# Patient Record
Sex: FEMALE | Race: WHITE | NOT HISPANIC OR LATINO
[De-identification: names, ages, dates, MRNs, and addresses within clinical notes are randomized per-mention and may not be internally consistent; named-entity substitution may affect disease eponyms.]

---

## 2022-01-14 ENCOUNTER — APPOINTMENT (OUTPATIENT)
Dept: GASTROENTEROLOGY | Facility: CLINIC | Age: 22
End: 2022-01-14
Payer: MEDICAID

## 2022-01-14 VITALS
OXYGEN SATURATION: 99 % | WEIGHT: 130 LBS | SYSTOLIC BLOOD PRESSURE: 110 MMHG | HEIGHT: 62 IN | TEMPERATURE: 97.1 F | HEART RATE: 87 BPM | BODY MASS INDEX: 23.92 KG/M2 | DIASTOLIC BLOOD PRESSURE: 70 MMHG | RESPIRATION RATE: 15 BRPM

## 2022-01-14 DIAGNOSIS — R10.9 UNSPECIFIED ABDOMINAL PAIN: ICD-10-CM

## 2022-01-14 PROBLEM — Z00.00 ENCOUNTER FOR PREVENTIVE HEALTH EXAMINATION: Status: ACTIVE | Noted: 2022-01-14

## 2022-01-14 PROCEDURE — 99204 OFFICE O/P NEW MOD 45 MIN: CPT

## 2022-01-17 NOTE — ASSESSMENT
[FreeTextEntry1] : 20 yo F PMH elevated cholesterol, PCOS who presents for evaluation of bloating that started Sept/Oct 2021 as well as change in bowel habits around that same time. \par \par Bloating, change in bowel habits: symptoms started in October\par - has tried FODMAP diet in the past but went on FODMAP for 8 weeks and was eating some FODMAPS just in more limited quantities. We reviewed that the FODMAP diet is supposed to be used only for 2-3 weeks to eliminate all FODMAP foods and to reintroduce foods one at a time to identify specific food triggers\par - should also eliminate dairy and gluten, which she has not yet done. Would also reintroduce these one at a time\par - nutritionist referral for assistance with the diet\par - SIBO breath test\par - diet/symptom journal\par - patients admits to having a lot of anxiety and stressors, wants to go back to school for nursing. We discussed the gut-brain axis disorders and the importance of trying to manage stress with mindfullness, meditation, yoga techniques\par - follow Mediterranean diet and \par - has been checked for celiac and negative, TSH wnl\par - patient is hesitant to get more invasive testing, if symptoms are not resolved within 1 month then will consider further work up with imaging, EGD with disaccharidase testing; colonoscopy for evaluation of change in bowel habits and work up of possible motility issues\par \par Follow up in 1 month

## 2022-01-17 NOTE — HISTORY OF PRESENT ILLNESS
[de-identified] : 22 yo F PMH elevated cholesterol, PCOS who presents for evaluation of bloating that started Sept/Oct 2021 as well as change in bowel habits around that same time. \par \par Since September/October feels very bloated. Happens after eating anything at all. Even if eats 1 blueberry will get very bloated, abdomen hurts. Usually upper abdomen more bloated. \par \par Has had symptoms for 4-5 mos now.\par \par Has gained 30 lbs in the past 1 year. \par Has images of significant bloating after eating. \par \par Wakes up in AM and abs hurt. \par \par No nighttime symptoms.\par No blood in the stool. \par No fevers, nausea, vomtiing. \par \par Used to have BMs daily, now doesn't go every day, now goes every 2-3 days. It is loose stool. No diarrhea. Feels like stool consistency has changed a lot. Previously used to be much harder but now is much looser and is like a long string. \par \par Has been getting a lot of stomachaches, it seems to be epigastric, periumbilical. \par Pain feels crampy in nature, lasts unitl the bloating goes down (takes about 4-5 hours). \par \par Drinks 30ox water in a day. \par No carbonated beverages, no chewing gum, no hard candies. Uses straws. \par She has lactaid, occasional dairy/cheese. \par \par Has a lot of flatulence. \par Tried gassex but didn't reduce the bloating. Seemed to increase the flatulence. \par \par Was checked for celiac with bloodwork and was negative. \par \par Bok pearl, string beans\par Tried cutting out gluten but didn't reduce bloating \par \par Did fodmap diet for 8 weeks, did not feel significantly better. Everything was the same intensity when reintroudcing foods. Did not feel bloating was better. \par \par Has prior work up with [facility]TG [facility] which was negative. \par \par Has a lot of life stressors. Even picking a place to eat feels stressed - anxiety. \par Depression and mood disorder \par \par She was feeling stable in terms of her life when the bloating started\par \par No prior EGD or colonoscopy\par No SIBO testing\par \par Does not take anything to help with BMs. \par \par CBC normal \par TSH normal 1.61\par BMP: normal \par celiac negative\par elevated cholesterol\par liver panel wnl \par \par Shes always been 100-110 lbs then suddenly was 125/130. Also changed birth control medicaions. Also has a history of PCOS\par \par PMH: \par elevated cholesterol\par PCOS \par \par PSH: \par denies\par \par FH: \par No FH of GI malignancies or IBD \par \par SH: \par never smoker\par social EtoH \par no MJ\par no illicit drug use\par medical scribe for physical therapist \par \par MEDS: \par lexapro \par abilify \par birth control\par \par ALL: \par NKDA

## 2022-01-17 NOTE — PHYSICAL EXAM
[General Appearance - Alert] : alert [General Appearance - In No Acute Distress] : in no acute distress [General Appearance - Well Nourished] : well nourished [General Appearance - Well Developed] : well developed [General Appearance - Well-Appearing] : healthy appearing [Sclera] : the sclera and conjunctiva were normal [PERRL With Normal Accommodation] : pupils were equal in size, round, and reactive to light [Extraocular Movements] : extraocular movements were intact [Outer Ear] : the ears and nose were normal in appearance [Oropharynx] : the oropharynx was normal [Neck Appearance] : the appearance of the neck was normal [Neck Cervical Mass (___cm)] : no neck mass was observed [Jugular Venous Distention Increased] : there was no jugular-venous distention [Thyroid Diffuse Enlargement] : the thyroid was not enlarged [Thyroid Nodule] : there were no palpable thyroid nodules [Auscultation Breath Sounds / Voice Sounds] : lungs were clear to auscultation bilaterally [Heart Rate And Rhythm] : heart rate was normal and rhythm regular [Heart Sounds] : normal S1 and S2 [Heart Sounds Gallop] : no gallops [Murmurs] : no murmurs [Heart Sounds Pericardial Friction Rub] : no pericardial rub [Edema] : there was no peripheral edema [Bowel Sounds] : normal bowel sounds [Abdomen Soft] : soft [Abdomen Tenderness] : non-tender [Abdomen Mass (___ Cm)] : no abdominal mass palpated [FreeTextEntry1] : tender to deep palpation, epigastric area, periumbilical area (above umbilicus), seemed to worsen when asked her to flex abdominal wall muscles [No CVA Tenderness] : no ~M costovertebral angle tenderness [No Spinal Tenderness] : no spinal tenderness [Abnormal Walk] : normal gait [Nail Clubbing] : no clubbing  or cyanosis of the fingernails [Musculoskeletal - Swelling] : no joint swelling seen [Motor Tone] : muscle strength and tone were normal [Skin Color & Pigmentation] : normal skin color and pigmentation [Skin Turgor] : normal skin turgor [] : no rash [Oriented To Time, Place, And Person] : oriented to person, place, and time [Impaired Insight] : insight and judgment were intact [Affect] : the affect was normal

## 2022-03-11 ENCOUNTER — APPOINTMENT (OUTPATIENT)
Dept: GASTROENTEROLOGY | Facility: CLINIC | Age: 22
End: 2022-03-11

## 2022-03-21 ENCOUNTER — APPOINTMENT (OUTPATIENT)
Dept: GASTROENTEROLOGY | Facility: CLINIC | Age: 22
End: 2022-03-21
Payer: MEDICAID

## 2022-03-21 PROCEDURE — 99215 OFFICE O/P EST HI 40 MIN: CPT | Mod: 95

## 2022-03-24 DIAGNOSIS — K63.89 OTHER SPECIFIED DISEASES OF INTESTINE: ICD-10-CM

## 2022-03-24 NOTE — PHYSICAL EXAM
[General Appearance - Alert] : alert [General Appearance - In No Acute Distress] : in no acute distress [General Appearance - Well Nourished] : well nourished [General Appearance - Well Developed] : well developed [General Appearance - Well-Appearing] : healthy appearing [Sclera] : the sclera and conjunctiva were normal [PERRL With Normal Accommodation] : pupils were equal in size, round, and reactive to light [Extraocular Movements] : extraocular movements were intact [Outer Ear] : the ears and nose were normal in appearance [Oropharynx] : the oropharynx was normal [Neck Appearance] : the appearance of the neck was normal [Neck Cervical Mass (___cm)] : no neck mass was observed [Jugular Venous Distention Increased] : there was no jugular-venous distention [Thyroid Diffuse Enlargement] : the thyroid was not enlarged [Thyroid Nodule] : there were no palpable thyroid nodules [Auscultation Breath Sounds / Voice Sounds] : lungs were clear to auscultation bilaterally [Heart Rate And Rhythm] : heart rate was normal and rhythm regular [Heart Sounds] : normal S1 and S2 [Heart Sounds Gallop] : no gallops [Murmurs] : no murmurs [Edema] : there was no peripheral edema [Heart Sounds Pericardial Friction Rub] : no pericardial rub [Bowel Sounds] : normal bowel sounds [Abdomen Soft] : soft [Abdomen Tenderness] : non-tender [Abdomen Mass (___ Cm)] : no abdominal mass palpated [No CVA Tenderness] : no ~M costovertebral angle tenderness [No Spinal Tenderness] : no spinal tenderness [Abnormal Walk] : normal gait [Nail Clubbing] : no clubbing  or cyanosis of the fingernails [Musculoskeletal - Swelling] : no joint swelling seen [Motor Tone] : muscle strength and tone were normal [Skin Color & Pigmentation] : normal skin color and pigmentation [] : no rash [Skin Turgor] : normal skin turgor [Oriented To Time, Place, And Person] : oriented to person, place, and time [Impaired Insight] : insight and judgment were intact [Affect] : the affect was normal [FreeTextEntry1] : tender to deep palpation, epigastric area, periumbilical area (above umbilicus), seemed to worsen when asked her to flex abdominal wall muscles

## 2022-03-24 NOTE — ASSESSMENT
[FreeTextEntry1] : 22 yo F PMH elevated cholesterol, PCOS who presents for evaluation of bloating that started Sept/Oct 2021 as well as change in bowel habits around that same time. 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 We discussed the gut-brain axis disorders and the importance of trying to manage stress with mindfullness, meditation, yoga techniques\par - follow Mediterranean diet and \par - has been checked for celiac and negative, TSH wnl\par - plan for EGD with celiac and disaccharidase testing; colonoscopy for evaluation of change in bowel habits and work up of possible motility issues. Consider possible SIBO/SIFO\par \par Follow up post-procedure

## 2022-04-07 ENCOUNTER — APPOINTMENT (OUTPATIENT)
Dept: GASTROENTEROLOGY | Facility: CLINIC | Age: 22
End: 2022-04-07
Payer: MEDICAID

## 2022-04-07 ENCOUNTER — RESULT REVIEW (OUTPATIENT)
Age: 22
End: 2022-04-07

## 2022-04-07 PROCEDURE — 45380 COLONOSCOPY AND BIOPSY: CPT

## 2022-04-07 PROCEDURE — 43239 EGD BIOPSY SINGLE/MULTIPLE: CPT

## 2022-06-20 ENCOUNTER — APPOINTMENT (OUTPATIENT)
Dept: GASTROENTEROLOGY | Facility: CLINIC | Age: 22
End: 2022-06-20

## 2022-06-20 PROCEDURE — 99213 OFFICE O/P EST LOW 20 MIN: CPT | Mod: 95

## 2022-06-23 NOTE — PHYSICAL EXAM
[General Appearance - Alert] : alert [Sclera] : the sclera and conjunctiva were normal [Outer Ear] : the ears and nose were normal in appearance [Oropharynx] : the oropharynx was normal [Neck Appearance] : the appearance of the neck was normal [Neck Cervical Mass (___cm)] : no neck mass was observed [Jugular Venous Distention Increased] : there was no jugular-venous distention [Thyroid Diffuse Enlargement] : the thyroid was not enlarged [Thyroid Nodule] : there were no palpable thyroid nodules [] : no respiratory distress [Edema] : there was no peripheral edema [Abnormal Walk] : normal gait [Skin Color & Pigmentation] : normal skin color and pigmentation [Oriented To Time, Place, And Person] : oriented to person, place, and time [General Appearance - In No Acute Distress] : in no acute distress [General Appearance - Well Nourished] : well nourished [FreeTextEntry1] : tender to deep palpation, epigastric area, periumbilical area (above umbilicus), seemed to worsen when asked her to flex abdominal wall muscles

## 2022-06-23 NOTE — HISTORY OF PRESENT ILLNESS
[de-identified] : 22 yo F PMH elevated cholesterol, PCOS who presents for evaluation of bloating that started Sept/Oct 2021 as well as change in bowel habits around that same time. \par \par Stools have been better since avoiding lactose. Completed rifaximin. A ltitle better but not back to normal. \par \par After a bit started having bloating again. Has another round and wants to take it. \par \par Has been working with a nutritionist. \par Has not felt the low FODMAP diet has helped. \par \par \par Had a [employer] cheeseburger and got food poisoning for 10 days (this was Feb 10th, after the fodmap diet completed) \par \par Takes about 10-15 min to get bloated \par She consistently bloated on and off the diet \par \par Has been having very loose stool, this started oct 2021\par It got better, now going every day (thinks its related to coffee) \par \par INITIAL HPI\par 22 yo F PMH elevated cholesterol, PCOS who presents for evaluation of bloating that started Sept/Oct 2021 as well as change in bowel habits around that same time. \par \par Since September/October feels very bloated. Happens after eating anything at all. Even if eats 1 blueberry will get very bloated, abdomen hurts. Usually upper abdomen more bloated. \par \par Has had symptoms for 4-5 mos now.\par \par Has gained 30 lbs in the past 1 year. \par Has images of significant bloating after eating. \par \par Wakes up in AM and abs hurt. \par \par No nighttime symptoms.\par No blood in the stool. \par No fevers, nausea, vomtiing. \par \par Used to have BMs daily, now doesn't go every day, now goes every 2-3 days. It is loose stool. No diarrhea. Feels like stool consistency has changed a lot. Previously used to be much harder but now is much looser and is like a long string. \par \par Has been getting a lot of stomachaches, it seems to be epigastric, periumbilical. \par Pain feels crampy in nature, lasts unitl the bloating goes down (takes about 4-5 hours). \par \par Drinks 30ox water in a day. \par No carbonated beverages, no chewing gum, no hard candies. Uses straws. \par She has lactaid, occasional dairy/cheese. \par \par Has a lot of flatulence. \par Tried gassex but didn't reduce the bloating. Seemed to increase the flatulence. \par \par Was checked for celiac with bloodwork and was negative. \par \par Bok pearl, string beans\par Tried cutting out gluten but didn't reduce bloating \par \par Did fodmap diet for 8 weeks, did not feel significantly better. Everything was the same intensity when reintroudcing foods. Did not feel bloating was better. \par \par Has prior work up with [facility] which was negative. \par \par Has a lot of life stressors. Even picking a place to eat feels stressed - anxiety. \par Depression and mood disorder \par \par She was feeling stable in terms of her life when the bloating started\par \par No prior EGD or colonoscopy\par No SIBO testing\par \par Does not take anything to help with BMs. \par \par CBC normal \par TSH normal 1.61\par BMP: normal \par celiac negative\par elevated cholesterol\par liver panel wnl \par \par Shes always been 100-110 lbs then suddenly was 125/130. Also changed birth control medicaions. Also has a history of PCOS\par \par PMH: \par elevated cholesterol\par PCOS \par \par PSH: \par denies\par \par FH: \par No FH of GI malignancies or IBD \par \par SH: \par never smoker\par social EtoH \par no MJ\par no illicit drug use\par medical scribe for physical therapist \par \par MEDS: \par lexapro \par abilify \par birth control\par \par ALL: \par NKDA

## 2022-06-23 NOTE — ASSESSMENT
[FreeTextEntry1] : 20 yo F PMH elevated cholesterol, PCOS who presents for follow up of bloating that started Sept/Oct 2021 as well as change in bowel habits around that same time. 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 We discussed the gut-brain axis disorders and the importance of trying to manage stress with mindfullness, meditation, yoga techniques\par - follow Mediterranean diet and \par - has been checked for celiac and negative, TSH wnl\par - s/p EGD with disaccharidase testing positive for lactase deficiency, colonoscopy unremarkable\par \par Follow up in 6 months

## 2022-08-17 ENCOUNTER — APPOINTMENT (OUTPATIENT)
Dept: GASTROENTEROLOGY | Facility: CLINIC | Age: 22
End: 2022-08-17

## 2022-08-17 ENCOUNTER — TRANSCRIPTION ENCOUNTER (OUTPATIENT)
Age: 22
End: 2022-08-17

## 2022-08-17 PROCEDURE — 99214 OFFICE O/P EST MOD 30 MIN: CPT | Mod: 95

## 2022-08-22 NOTE — ASSESSMENT
[FreeTextEntry1] : 20 yo F PMH elevated cholesterol, PCOS, and recently diagnosed Hashimoto's disease with hypothyroidism  presenting for f/u of abdominal bloating and diarrhea that started in Fall 2021. 4/2022 EGD and colonoscopy were not diagnostic, +lactase deficiency, +SIBO now s/p 2 courses of rifaximin with recurrent symptoms. Initially better after each rifaximin course but bloating returned. Only current complaint is abdominal bloating which occurs soon after eating. Denies N/V, abdominal pain, constipation, diarrhea, or excessive flatulence. She is following dairy free diet and has tried strict FODMAP diet with nutritionist which she states did not change her symptoms at all. Since starting Synthroid for the Hashimoto's, her mood and energy level has significantly improved.\par \par # Abdominal Bloating; unclear etiology; due to hypothyroidism vs functional bloating\par - given unsuccessful rifaxamin course x2, careful avoidance of dairy, and otherwise negative w/u thus far from GI perspective, symptoms may be attributable to her newly diagnosed thyroid disease. She described initial onset as abrupt with bloating and diarrhea and now the diarrhea aspect has resolved with only bloating remaining. This can possible be correlated to the natural course of Hashimoto's with hyperthyroid state followed by hypothyroid state which she is currently in as per her w/u at [facility] and currently on synthroid therapy \par - continue avoidance of lactose (given lactase deficiency on disaccharidase testing)\par - c/w nutritionist (patient is working with one now) \par - c/w diet/symptom journal\par - would wait until thryoid function is corrected and reassess symptoms at that time. If symptoms persist despite normal thyroid function, consider gastric emptying study to r/u organic cause of bloating\par \par f/u in 6 months

## 2022-08-22 NOTE — HISTORY OF PRESENT ILLNESS
[Home] : at home, [unfilled] , at the time of the visit. [Medical Office: ([facility])___] : at the medical office located in  [Verbal consent obtained from patient] : the patient, [unfilled] [de-identified] : 22 yo F PMH elevated cholesterol, PCOS, and recently diagnosed Hashimoto's disease with hypothyroidism  presenting for f/u of abdominal bloating and diarrhea that started in Fall 2021. Pt previously seen for bloating and loose stool, now s/p EGD and colonoscopy which were not diagnostic, +lactase deficiency, +SIBO now s/p 2 courses of rifaximin with recurrent symptoms. She reports that after each rifaximin course she felt better for a few days but then bloating returned. Only current complaint is abdominal bloating which occurs soon after eating. Denies N/V, abdominal pain, constipation, diarrhea, or excessive flatulence. She is following dairy free diet and has tried strict FODMAP diet with nutritionist which she states did not change her symptoms at all. Since starting Synthroid for the Hashimoto's, her mood and energy level has significantly improved. Planning on going back to graduate school soon. \par \par \par Prior Hx\par \par 22 yo F PMH elevated cholesterol, PCOS who presents for evaluation of bloating that started Sept/Oct 2021 as well as change in bowel habits around that same time. \par \par Stools have been better since avoiding lactose. Completed rifaximin. A ltitle better but not back to normal. \par \par After a bit started having bloating again. Has another round and wants to take it. \par \par Has been working with a nutritionist. \par Has not felt the low FODMAP diet has helped. \par \par \par Had a [employer] cheeseburger and got food poisoning for 10 days (this was Feb 10th, after the fodmap diet completed) \par \par Takes about 10-15 min to get bloated \par She consistently bloated on and off the diet \par \par Has been having very loose stool, this started oct 2021\par It got better, now going every day (thinks its related to coffee) \par \par INITIAL HPI\par 22 yo F PMH elevated cholesterol, PCOS who presents for evaluation of bloating that started Sept/Oct 2021 as well as change in bowel habits around that same time. \par \par Since September/October feels very bloated. Happens after eating anything at all. Even if eats 1 blueberry will get very bloated, abdomen hurts. Usually upper abdomen more bloated. \par \par Has had symptoms for 4-5 mos now.\par \par Has gained 30 lbs in the past 1 year. \par Has images of significant bloating after eating. \par \par Wakes up in AM and abs hurt. \par \par No nighttime symptoms.\par No blood in the stool. \par No fevers, nausea, vomtiing. \par \par Used to have BMs daily, now doesn't go every day, now goes every 2-3 days. It is loose stool. No diarrhea. Feels like stool consistency has changed a lot. Previously used to be much harder but now is much looser and is like a long string. \par \par Has been getting a lot of stomachaches, it seems to be epigastric, periumbilical. \par Pain feels crampy in nature, lasts unitl the bloating goes down (takes about 4-5 hours). \par \par Drinks 30ox water in a day. \par No carbonated beverages, no chewing gum, no hard candies. Uses straws. \par She has lactaid, occasional dairy/cheese. \par \par Has a lot of flatulence. \par Tried gassex but didn't reduce the bloating. Seemed to increase the flatulence. \par \par Was checked for celiac with bloodwork and was negative. \par \par Bok pearl, string beans\par Tried cutting out gluten but didn't reduce bloating \par \par Did fodmap diet for 8 weeks, did not feel significantly better. Everything was the same intensity when reintroudcing foods. Did not feel bloating was better. \par \par Has prior work up with TTG IGA which was negative. \par \par Has a lot of life stressors. Even picking a place to eat feels stressed - anxiety. \par Depression and mood disorder \par \par She was feeling stable in terms of her life when the bloating started\par \par No prior EGD or colonoscopy\par No SIBO testing\par \par Does not take anything to help with BMs. \par \par CBC normal \par TSH normal 1.61\par BMP: normal \par celiac negative\par elevated cholesterol\par liver panel wnl \par \par Shes always been 100-110 lbs then suddenly was 125/130. Also changed birth control medicaions. Also has a history of PCOS\par \par PMH: \par elevated cholesterol\par PCOS \par \par PSH: \par denies\par \par FH: \par No FH of GI malignancies or IBD \par \par SH: \par never smoker\par social EtoH \par no MJ\par no illicit drug use\par medical scribe for physical therapist \par \par MEDS: \par lexapro \par abilify \par birth control\par \par ALL: \par NKDA.

## 2022-08-22 NOTE — HISTORY OF PRESENT ILLNESS
[Home] : at home, [unfilled] , at the time of the visit. [Medical Office: ([facility])___] : at the medical office located in  [Verbal consent obtained from patient] : the patient, [unfilled] [de-identified] : 22 yo F PMH elevated cholesterol, PCOS, and recently diagnosed Hashimoto's disease with hypothyroidism  presenting for f/u of abdominal bloating and diarrhea that started in Fall 2021. Pt previously seen for bloating and loose stool, now s/p EGD and colonoscopy which were not diagnostic, +lactase deficiency, +SIBO now s/p 2 courses of rifaximin with recurrent symptoms. She reports that after each rifaximin course she felt better for a few days but then bloating returned. Only current complaint is abdominal bloating which occurs soon after eating. Denies N/V, abdominal pain, constipation, diarrhea, or excessive flatulence. She is following dairy free diet and has tried strict FODMAP diet with nutritionist which she states did not change her symptoms at all. Since starting Synthroid for the Hashimoto's, her mood and energy level has significantly improved. Planning on going back to graduate school soon. \par \par \par Prior Hx\par \par 22 yo F PMH elevated cholesterol, PCOS who presents for evaluation of bloating that started Sept/Oct 2021 as well as change in bowel habits around that same time. \par \par Stools have been better since avoiding lactose. Completed rifaximin. A ltitle better but not back to normal. \par \par After a bit started having bloating again. Has another round and wants to take it. \par \par Has been working with a nutritionist. \par Has not felt the low FODMAP diet has helped. \par \par \par Had a [employer] cheeseburger and got food poisoning for 10 days (this was Feb 10th, after the fodmap diet completed) \par \par Takes about 10-15 min to get bloated \par She consistently bloated on and off the diet \par \par Has been having very loose stool, this started oct 2021\par It got better, now going every day (thinks its related to coffee) \par \par INITIAL HPI\par 22 yo F PMH elevated cholesterol, PCOS who presents for evaluation of bloating that started Sept/Oct 2021 as well as change in bowel habits around that same time. \par \par Since September/October feels very bloated. Happens after eating anything at all. Even if eats 1 blueberry will get very bloated, abdomen hurts. Usually upper abdomen more bloated. \par \par Has had symptoms for 4-5 mos now.\par \par Has gained 30 lbs in the past 1 year. \par Has images of significant bloating after eating. \par \par Wakes up in AM and abs hurt. \par \par No nighttime symptoms.\par No blood in the stool. \par No fevers, nausea, vomtiing. \par \par Used to have BMs daily, now doesn't go every day, now goes every 2-3 days. It is loose stool. No diarrhea. Feels like stool consistency has changed a lot. Previously used to be much harder but now is much looser and is like a long string. \par \par Has been getting a lot of stomachaches, it seems to be epigastric, periumbilical. \par Pain feels crampy in nature, lasts unitl the bloating goes down (takes about 4-5 hours). \par \par Drinks 30ox water in a day. \par No carbonated beverages, no chewing gum, no hard candies. Uses straws. \par She has lactaid, occasional dairy/cheese. \par \par Has a lot of flatulence. \par Tried gassex but didn't reduce the bloating. Seemed to increase the flatulence. \par \par Was checked for celiac with bloodwork and was negative. \par \par Bok pearl, string beans\par Tried cutting out gluten but didn't reduce bloating \par \par Did fodmap diet for 8 weeks, did not feel significantly better. Everything was the same intensity when reintroudcing foods. Did not feel bloating was better. \par \par Has prior work up with [facility] which was negative. \par \par Has a lot of life stressors. Even picking a place to eat feels stressed - anxiety. \par Depression and mood disorder \par \par She was feeling stable in terms of her life when the bloating started\par \par No prior EGD or colonoscopy\par No SIBO testing\par \par Does not take anything to help with BMs. \par \par CBC normal \par TSH normal 1.61\par BMP: normal \par celiac negative\par elevated cholesterol\par liver panel wnl \par \par Shes always been 100-110 lbs then suddenly was 125/130. Also changed birth control medicaions. Also has a history of PCOS\par \par PMH: \par elevated cholesterol\par PCOS \par \par PSH: \par denies\par \par FH: \par No FH of GI malignancies or IBD \par \par SH: \par never smoker\par social EtoH \par no MJ\par no illicit drug use\par medical scribe for physical therapist \par \par MEDS: \par lexapro \par abilify \par birth control\par \par ALL: \par NKDA.

## 2022-08-22 NOTE — ASSESSMENT
[FreeTextEntry1] : 20 yo F PMH elevated cholesterol, PCOS, and recently diagnosed Hashimoto's disease with hypothyroidism  presenting for f/u of abdominal bloating and diarrhea that started in Fall 2021. 4/2022 EGD and colonoscopy were not diagnostic, +lactase deficiency, +SIBO now s/p 2 courses of rifaximin with recurrent symptoms. Initially better after each rifaximin course but bloating returned. Only current complaint is abdominal bloating which occurs soon after eating. Denies N/V, abdominal pain, constipation, diarrhea, or excessive flatulence. She is following dairy free diet and has tried strict FODMAP diet with nutritionist which she states did not change her symptoms at all. Since starting Synthroid for the Hashimoto's, her mood and energy level has significantly improved.\par \par # Abdominal Bloating; unclear etiology; due to hypothyroidism vs functional bloating\par - given unsuccessful rifaxamin course x2, careful avoidance of dairy, and otherwise negative w/u thus far from GI perspective, symptoms may be attributable to her newly diagnosed thyroid disease. She described initial onset as abrupt with bloating and diarrhea and now the diarrhea aspect has resolved with only bloating remaining. This can possible be correlated to the natural course of Hashimoto's with hyperthyroid state followed by hypothyroid state which she is currently in as per her w/u at [facility] and currently on synthroid therapy \par - continue avoidance of lactose (given lactase deficiency on disaccharidase testing)\par - c/w nutritionist (patient is working with one now) \par - c/w diet/symptom journal\par - would wait until thryoid function is corrected and reassess symptoms at that time. If symptoms persist despite normal thyroid function, consider gastric emptying study to r/u organic cause of bloating\par \par f/u in 6 months

## 2023-02-22 ENCOUNTER — APPOINTMENT (OUTPATIENT)
Dept: ENDOCRINOLOGY | Facility: CLINIC | Age: 23
End: 2023-02-22
Payer: MEDICAID

## 2023-02-22 DIAGNOSIS — E06.3 AUTOIMMUNE THYROIDITIS: ICD-10-CM

## 2023-02-22 PROCEDURE — 99204 OFFICE O/P NEW MOD 45 MIN: CPT

## 2023-02-22 RX ORDER — LEVOTHYROXINE SODIUM 0.03 MG/1
25 TABLET ORAL DAILY
Qty: 30 | Refills: 5 | Status: ACTIVE | COMMUNITY
Start: 2023-02-22 | End: 1900-01-01

## 2023-02-23 RX ORDER — DROSPIRENONE AND ETHINYL ESTRADIOL 0.02-3(28)
3-0.02 KIT ORAL
Qty: 84 | Refills: 0 | Status: ACTIVE | COMMUNITY
Start: 2022-10-28

## 2023-02-23 RX ORDER — RIFAXIMIN 550 MG/1
550 TABLET ORAL
Qty: 42 | Refills: 0 | Status: DISCONTINUED | COMMUNITY
Start: 2022-03-24 | End: 2023-02-23

## 2023-02-23 RX ORDER — ESCITALOPRAM OXALATE 10 MG/1
10 TABLET ORAL
Qty: 30 | Refills: 0 | Status: ACTIVE | COMMUNITY
Start: 2023-01-07

## 2023-02-23 NOTE — ASSESSMENT
[FreeTextEntry1] : Hashimoto's.  PCOS\par Explained that immune system is attacking thyroid, causing hypothyroidism.  Need for replacement thyroxine will likely be lifelong, and it is not uncommon to require higher thyroxine doses with longer duration of Hashimoto's, as more of native gland fails. \par Since TSH is in mid normal range, symptoms are not due to thyroid cause.\par will send for thyroid sono to evaluate for nodules.\par Suggested sleep evaluation \par I recommended low glycemic (low carb) diet, and agree with estrogen/progestin pill treatment.  If weight gain continues, we could try metformin.   I think weight gain is due to PCOS and maybe some contribution from Abilify (but not due to thyroid)\par RTO 1 year\par

## 2023-02-23 NOTE — HISTORY OF PRESENT ILLNESS
[FreeTextEntry1] : here with mother\par Mother has Hashimoto's and thyroid was monitored/checked in patient during high school.\par She had previously seen Dr Dye at [facility] (who has now reduced her patient hours)\par Since Nov 2021, pt was gaining weight and had increasing fatigue, needing to sleep more. \par She was diagnosed with PCOS and after starting estrogen/progestin pill, she lost weight.\par Currently, she reports excessive sleepiness, to the point she is falling asleep in meetings (around 11am and 4:30pm), bloating, and excessive sweating.  She is also gaining weight again, 30 lb in the past 6 months.   Levothyroxine was started 6 months ago.\par She has some acne, no hirsutism.\par She saw GI, was diagnosed with SIBO and treated with antibiotics and Xifaxan but bloating persists.\par She sleeps 8-10 hours/night.\par \par Meds\par levothyroxine 25mcg\par Lexapro, Abilify  (on Ability for about 1 year)\par Brissa estrogen/progestin pill

## 2023-02-23 NOTE — PHYSICAL EXAM
[Alert] : alert [No Acute Distress] : no acute distress [No Proptosis] : no proptosis [No Lid Lag] : no lid lag [Normal Hearing] : hearing was normal [No LAD] : no lymphadenopathy [Clear to Auscultation] : lungs were clear to auscultation bilaterally [Normal S1, S2] : normal S1 and S2 [Regular Rhythm] : with a regular rhythm [Normal Affect] : the affect was normal [Normal Mood] : the mood was normal [de-identified] : thyroid palpable, soft, mildly enlarged L> R

## 2023-02-23 NOTE — DATA REVIEWED
[FreeTextEntry1] : 10/22 (pt phone)  TSH 1.83, tot chol 177, trig 174, HDL 60, LDL 88, B12 408\par 7/22 (pr phone)  TSH 3.5475, TPO 1.1, Tg Ab 291, celiac Ab neg

## 2023-05-03 ENCOUNTER — APPOINTMENT (OUTPATIENT)
Dept: PULMONOLOGY | Facility: CLINIC | Age: 23
End: 2023-05-03

## 2023-08-09 ENCOUNTER — APPOINTMENT (OUTPATIENT)
Dept: GASTROENTEROLOGY | Facility: CLINIC | Age: 23
End: 2023-08-09
Payer: MEDICAID

## 2023-08-09 PROCEDURE — 99214 OFFICE O/P EST MOD 30 MIN: CPT | Mod: 95

## 2023-08-09 NOTE — ASSESSMENT
[FreeTextEntry1] : 23 yo F PMH elevated cholesterol, PCOS, Hashimoto's who presents for evaluation of bloating that started Sept/Oct 2021 as well as change in bowel habits around that same time.   # Abdominal Bloating; unclear etiology, weight gain - likely a component of PCOS contributing to weight gain as well as abilify - rifaxamin course x2 for SIBO with initial improvement then recurrence - using lactase supplements but not avoiding dairy (given lactase deficiency on disaccharidase testing) - nutritionist referral - c/w diet/symptom journal - CT A/P given ongoing symptoms of abdominal discomfort/bloating, and weight gain (normal EGD/Colon) - if above work up unrevealing consider gastric emptying study to r/u organic cause of bloating - trial of IBGard - trial beano before meals (feels gassex does not help that much)  - avoid straws, carbonated beverages, chewing gum, sucking on hard candies, eating quickly  Follow up in 3-4 mos or sooner PRN

## 2023-08-10 ENCOUNTER — TRANSCRIPTION ENCOUNTER (OUTPATIENT)
Age: 23
End: 2023-08-10

## 2023-08-25 ENCOUNTER — APPOINTMENT (OUTPATIENT)
Dept: GASTROENTEROLOGY | Facility: CLINIC | Age: 23
End: 2023-08-25
Payer: MEDICAID

## 2023-08-25 VITALS — HEIGHT: 62 IN | WEIGHT: 143 LBS

## 2023-08-25 PROCEDURE — 97802 MEDICAL NUTRITION INDIV IN: CPT | Mod: 95

## 2023-09-01 ENCOUNTER — APPOINTMENT (OUTPATIENT)
Dept: GASTROENTEROLOGY | Facility: CLINIC | Age: 23
End: 2023-09-01
Payer: MEDICAID

## 2023-09-01 PROCEDURE — 99443: CPT

## 2023-09-19 ENCOUNTER — APPOINTMENT (OUTPATIENT)
Dept: GASTROENTEROLOGY | Facility: CLINIC | Age: 23
End: 2023-09-19
Payer: MEDICAID

## 2023-09-19 PROCEDURE — 97803 MED NUTRITION INDIV SUBSEQ: CPT | Mod: 95

## 2023-11-13 ENCOUNTER — APPOINTMENT (OUTPATIENT)
Dept: GASTROENTEROLOGY | Facility: CLINIC | Age: 23
End: 2023-11-13

## 2023-11-20 ENCOUNTER — APPOINTMENT (OUTPATIENT)
Dept: GASTROENTEROLOGY | Facility: CLINIC | Age: 23
End: 2023-11-20
Payer: MEDICAID

## 2023-11-20 VITALS — WEIGHT: 147 LBS

## 2023-11-20 PROCEDURE — 97803 MED NUTRITION INDIV SUBSEQ: CPT | Mod: 95

## 2023-11-21 ENCOUNTER — APPOINTMENT (OUTPATIENT)
Dept: GASTROENTEROLOGY | Facility: HOSPITAL | Age: 23
End: 2023-11-21
Payer: MEDICAID

## 2023-11-21 ENCOUNTER — NON-APPOINTMENT (OUTPATIENT)
Age: 23
End: 2023-11-21

## 2023-11-21 PROCEDURE — ZZZZZ: CPT

## 2023-12-06 ENCOUNTER — APPOINTMENT (OUTPATIENT)
Dept: HEPATOLOGY | Facility: CLINIC | Age: 23
End: 2023-12-06
Payer: MEDICAID

## 2023-12-06 VITALS
DIASTOLIC BLOOD PRESSURE: 78 MMHG | HEIGHT: 62.5 IN | HEART RATE: 109 BPM | OXYGEN SATURATION: 95 % | RESPIRATION RATE: 15 BRPM | BODY MASS INDEX: 25.48 KG/M2 | WEIGHT: 142 LBS | TEMPERATURE: 96.8 F | SYSTOLIC BLOOD PRESSURE: 118 MMHG

## 2023-12-06 PROCEDURE — 99213 OFFICE O/P EST LOW 20 MIN: CPT

## 2023-12-08 ENCOUNTER — APPOINTMENT (OUTPATIENT)
Dept: GASTROENTEROLOGY | Facility: CLINIC | Age: 23
End: 2023-12-08
Payer: MEDICAID

## 2023-12-08 PROCEDURE — 97803 MED NUTRITION INDIV SUBSEQ: CPT | Mod: 95

## 2023-12-11 ENCOUNTER — APPOINTMENT (OUTPATIENT)
Dept: HEPATOLOGY | Facility: CLINIC | Age: 23
End: 2023-12-11
Payer: MEDICAID

## 2023-12-11 PROCEDURE — 76981 USE PARENCHYMA: CPT

## 2023-12-12 LAB
ANA SER IF-ACNC: NEGATIVE
CERULOPLASMIN SERPL-MCNC: 40 MG/DL
FERRITIN SERPL-MCNC: 130 NG/ML
IRON SATN MFR SERPL: 20 %
IRON SERPL-MCNC: 62 UG/DL
LKM AB SER QL IF: <20.1 UNITS
SMOOTH MUSCLE AB SER QL IF: NORMAL
TIBC SERPL-MCNC: 317 UG/DL
UIBC SERPL-MCNC: 255 UG/DL

## 2024-01-03 ENCOUNTER — APPOINTMENT (OUTPATIENT)
Dept: GASTROENTEROLOGY | Facility: CLINIC | Age: 24
End: 2024-01-03
Payer: MEDICAID

## 2024-01-03 VITALS
RESPIRATION RATE: 16 BRPM | OXYGEN SATURATION: 97 % | WEIGHT: 138 LBS | BODY MASS INDEX: 24.76 KG/M2 | DIASTOLIC BLOOD PRESSURE: 67 MMHG | HEIGHT: 62.5 IN | HEART RATE: 119 BPM | TEMPERATURE: 97.2 F | SYSTOLIC BLOOD PRESSURE: 113 MMHG

## 2024-01-03 PROCEDURE — 99214 OFFICE O/P EST MOD 30 MIN: CPT

## 2024-01-04 LAB
ALBUMIN SERPL ELPH-MCNC: 4.5 G/DL
ALP BLD-CCNC: 65 U/L
ALT SERPL-CCNC: 54 U/L
ANION GAP SERPL CALC-SCNC: 11 MMOL/L
AST SERPL-CCNC: 30 U/L
BILIRUB SERPL-MCNC: 0.2 MG/DL
BUN SERPL-MCNC: 10 MG/DL
CALCIUM SERPL-MCNC: 9.6 MG/DL
CHLORIDE SERPL-SCNC: 105 MMOL/L
CO2 SERPL-SCNC: 25 MMOL/L
CREAT SERPL-MCNC: 0.6 MG/DL
EGFR: 128 ML/MIN/1.73M2
GLUCOSE SERPL-MCNC: 134 MG/DL
HCT VFR BLD CALC: 42.8 %
HGB BLD-MCNC: 14 G/DL
LPL SERPL-CCNC: 19 U/L
MCHC RBC-ENTMCNC: 30 PG
MCHC RBC-ENTMCNC: 32.7 GM/DL
MCV RBC AUTO: 91.8 FL
PLATELET # BLD AUTO: 367 K/UL
POTASSIUM SERPL-SCNC: 4.3 MMOL/L
PROT SERPL-MCNC: 7.6 G/DL
RBC # BLD: 4.66 M/UL
RBC # FLD: 12.2 %
SODIUM SERPL-SCNC: 142 MMOL/L
WBC # FLD AUTO: 5.22 K/UL

## 2024-01-05 ENCOUNTER — APPOINTMENT (OUTPATIENT)
Dept: GASTROENTEROLOGY | Facility: CLINIC | Age: 24
End: 2024-01-05
Payer: MEDICAID

## 2024-01-05 ENCOUNTER — APPOINTMENT (OUTPATIENT)
Dept: GASTROENTEROLOGY | Facility: CLINIC | Age: 24
End: 2024-01-05

## 2024-01-05 LAB
HBV SURFACE AB SERPL IA-ACNC: 123.4 MIU/ML
HCV AB SER QL: NONREACTIVE
HCV S/CO RATIO: 0.31 S/CO
HEPATITIS A IGG ANTIBODY: NONREACTIVE

## 2024-01-05 PROCEDURE — 97803 MED NUTRITION INDIV SUBSEQ: CPT | Mod: 95

## 2024-01-08 NOTE — HISTORY OF PRESENT ILLNESS
[FreeTextEntry1] : 24 yo F PMH elevated cholesterol, PCOS, Hashimoto's who presents for follow up visit, now complaining of nausea/vomiting.    Started lamictal in October 2023. Started having vomiting  Had an episode of food poisoning for about a week. Was also taking diflucan because of yeast infection. The diflucan also made her vomit. She vomited sporadically while on diflucan, that happened each time she took it. Wasn't after eating, would just happen throughout the day.  The first week of Oct had food poisoning. Started lamictal after food poisoning. Started second week in October. Stopped having vomiting. Then started lamictal, and then started throwing up after every meal.  During food poisoning was having pain and waking up at night.   Having a hard time brushing her teeth, feels nauseous and throws up.  Can't brush her teeth as long as she used to. Will throw up bile.  Has a history of throwing up when VERY stressed out.  With small meals is a little better. If alters anything from home regimen, such as going out to eat with friends, will throw up the entire meal.  If she moves after drinking or eating will throw up.  If has too much liquid will throw up.  Tries to sit for 30 min before moving after eating.   Some meals are fine, some meals are not.   Went to [place] to visit friends. Ate out every meal and then when going out a lot, had coffee, and vomited a lot more.  When she is at home symptoms are much better. Has to have small meals.

## 2024-01-08 NOTE — PHYSICAL EXAM
[Alert] : alert [Normal Voice/Communication] : normal voice/communication [Healthy Appearing] : healthy appearing [No Acute Distress] : no acute distress [Sclera] : the sclera and conjunctiva were normal [Hearing Threshold Finger Rub Not [place]] : hearing was normal [Normal Lips/Gums] : the lips and gums were normal [Oropharynx] : the oropharynx was normal [Normal Appearance] : the appearance of the neck was normal [No Neck Mass] : no neck mass was observed [No Respiratory Distress] : no respiratory distress [No Acc Muscle Use] : no accessory muscle use [Respiration, Rhythm And Depth] : normal respiratory rhythm and effort [Auscultation Breath Sounds / Voice Sounds] : lungs were clear to auscultation bilaterally [Heart Rate And Rhythm] : heart rate was normal and rhythm regular [Normal S1, S2] : normal S1 and S2 [Murmurs] : no murmurs [Bowel Sounds] : normal bowel sounds [Abdomen Tenderness] : non-tender [No Masses] : no abdominal mass palpated [Abdomen Soft] : soft [] : no hepatosplenomegaly [Oriented To Time, Place, And Person] : oriented to person, place, and time

## 2024-02-09 ENCOUNTER — APPOINTMENT (OUTPATIENT)
Dept: HEPATOLOGY | Facility: CLINIC | Age: 24
End: 2024-02-09
Payer: MEDICAID

## 2024-02-09 VITALS
HEIGHT: 62.5 IN | SYSTOLIC BLOOD PRESSURE: 113 MMHG | BODY MASS INDEX: 25.12 KG/M2 | WEIGHT: 140 LBS | HEART RATE: 91 BPM | OXYGEN SATURATION: 97 % | TEMPERATURE: 97.5 F | DIASTOLIC BLOOD PRESSURE: 77 MMHG

## 2024-02-09 PROCEDURE — 99213 OFFICE O/P EST LOW 20 MIN: CPT

## 2024-02-09 NOTE — PHYSICAL EXAM
[General Appearance - Alert] : alert [General Appearance - In No Acute Distress] : in no acute distress [Sclera] : the sclera and conjunctiva were normal [Extraocular Movements] : extraocular movements were intact [Auscultation Breath Sounds / Voice Sounds] : lungs were clear to auscultation bilaterally [Heart Rate And Rhythm] : heart rate was normal and rhythm regular [Heart Sounds] : normal S1 and S2 [Bowel Sounds] : normal bowel sounds [Abdomen Soft] : soft [Abdomen Tenderness] : non-tender [Abnormal Walk] : normal gait [Musculoskeletal - Swelling] : no joint swelling seen [Skin Color & Pigmentation] : normal skin color and pigmentation [] : no rash [Oriented To Time, Place, And Person] : oriented to person, place, and time [Impaired Insight] : insight and judgment were intact [Affect] : the affect was normal [Mood] : the mood was normal

## 2024-02-09 NOTE — HISTORY OF PRESENT ILLNESS
[FreeTextEntry1] : 24y/o lady with a history of Hashimoto's thyroiditis on synthroid, sleep d/o here for follow up. Seen for initial evaluation for slightly elevated liver enzymes and steatosis noted on imaging. ALT and AST are in the 30's (up from a year ago when they were in the teens). Bili, alk phos are wnl. These were noted while being worked up for GI symptoms of bloating and early satiety. Gained weight in the past years ~ 40lbs but in the past several months has started to lose weight due to persistent nausea and vomiting.   Elevated liver enzymes was presumed due to MASLD/MASH given pattern of enzymes and US imaging findings.  Liver serologies unrevealing. Very Rarely drinks alcohol. Fibroscan: F0/S3   At today's visit, she reports doing well. GI symptoms are improving. Pending a Gastric emptying study. Lost weight since her last visit but mostly related to her nausea and vomiting episodes. She is also trying to eat healthy and starting to work out slowly.  Current meds: Meds: Aripripazole 2mg and Lamotrigine 200mg, Birth control for PCOS, Synthroid 25, Modafinil

## 2024-02-09 NOTE — ASSESSMENT
[FreeTextEntry1] : 22y/o lady with a history of Bipolar d/o, Hashimoto's thyroiditis on synthroid, sleep d/o (on modafinil) who is here for follow up. Initially seen for evaluation for fatty liver and slightly elevated liver enzymes.  Liver synthetic function appears preserved. AST & ALT slightly elevated. ALT>AST.  Alk phos and Tbili are normal. Suspect MASLD - given weight gain in the last 3-4 years. fibroscan with S3 steatosis   CLD largely unrevealing Fibroscan with no fibrosis but withS3 steatosis. Suspect lean NAFLD. Discussed lifestyle changes - eating healthy, working on strengthening her core - which will help her liver and help her bloating symptoms and her mental health. Goal weight loss is about 10lbs in the next 6 months. Will repeat fibroscan in one year but use her weight and enzymes as a surrogate for her liver disease for now.

## 2024-02-14 ENCOUNTER — RESULT REVIEW (OUTPATIENT)
Age: 24
End: 2024-02-14

## 2024-02-21 ENCOUNTER — APPOINTMENT (OUTPATIENT)
Dept: NUCLEAR MEDICINE | Facility: HOSPITAL | Age: 24
End: 2024-02-21
Payer: MEDICAID

## 2024-02-21 ENCOUNTER — OUTPATIENT (OUTPATIENT)
Dept: OUTPATIENT SERVICES | Facility: HOSPITAL | Age: 24
LOS: 1 days | End: 2024-02-21
Payer: MEDICAID

## 2024-02-21 PROCEDURE — A9541: CPT

## 2024-02-21 PROCEDURE — 78264 GASTRIC EMPTYING IMG STUDY: CPT

## 2024-02-21 PROCEDURE — 78264 GASTRIC EMPTYING IMG STUDY: CPT | Mod: 26

## 2024-03-01 ENCOUNTER — APPOINTMENT (OUTPATIENT)
Dept: GASTROENTEROLOGY | Facility: CLINIC | Age: 24
End: 2024-03-01
Payer: MEDICAID

## 2024-03-01 DIAGNOSIS — R11.10 VOMITING, UNSPECIFIED: ICD-10-CM

## 2024-03-01 DIAGNOSIS — R14.0 ABDOMINAL DISTENSION (GASEOUS): ICD-10-CM

## 2024-03-01 PROCEDURE — 97803 MED NUTRITION INDIV SUBSEQ: CPT | Mod: 95

## 2024-03-03 PROBLEM — R11.10 VOMITING, UNSPECIFIED VOMITING TYPE, UNSPECIFIED WHETHER NAUSEA PRESENT: Status: ACTIVE | Noted: 2024-01-03

## 2024-03-03 PROBLEM — R14.0 ABDOMINAL BLOATING: Status: ACTIVE | Noted: 2022-01-14

## 2024-03-14 ENCOUNTER — NON-APPOINTMENT (OUTPATIENT)
Age: 24
End: 2024-03-14

## 2024-05-06 LAB
ALBUMIN SERPL ELPH-MCNC: 4.5 G/DL
ALP BLD-CCNC: 71 U/L
ALT SERPL-CCNC: 38 U/L
ANION GAP SERPL CALC-SCNC: 14 MMOL/L
AST SERPL-CCNC: 29 U/L
BILIRUB SERPL-MCNC: 0.3 MG/DL
BUN SERPL-MCNC: 8 MG/DL
CALCIUM SERPL-MCNC: 9.7 MG/DL
CHLORIDE SERPL-SCNC: 101 MMOL/L
CO2 SERPL-SCNC: 23 MMOL/L
CREAT SERPL-MCNC: 0.61 MG/DL
EGFR: 128 ML/MIN/1.73M2
GLUCOSE SERPL-MCNC: 87 MG/DL
POTASSIUM SERPL-SCNC: 4.5 MMOL/L
PROT SERPL-MCNC: 7.6 G/DL
SODIUM SERPL-SCNC: 138 MMOL/L

## 2024-05-09 ENCOUNTER — APPOINTMENT (OUTPATIENT)
Dept: HEPATOLOGY | Facility: CLINIC | Age: 24
End: 2024-05-09
Payer: MEDICAID

## 2024-05-09 VITALS
RESPIRATION RATE: 16 BRPM | BODY MASS INDEX: 24.76 KG/M2 | SYSTOLIC BLOOD PRESSURE: 117 MMHG | HEIGHT: 62.5 IN | TEMPERATURE: 98.3 F | WEIGHT: 138 LBS | DIASTOLIC BLOOD PRESSURE: 84 MMHG | HEART RATE: 96 BPM | OXYGEN SATURATION: 95 %

## 2024-05-09 DIAGNOSIS — K76.0 FATTY (CHANGE OF) LIVER, NOT ELSEWHERE CLASSIFIED: ICD-10-CM

## 2024-05-09 DIAGNOSIS — R79.89 OTHER SPECIFIED ABNORMAL FINDINGS OF BLOOD CHEMISTRY: ICD-10-CM

## 2024-05-09 PROCEDURE — 99213 OFFICE O/P EST LOW 20 MIN: CPT

## 2024-05-09 RX ORDER — LORAZEPAM 0.5 MG/1
0.5 TABLET ORAL
Qty: 15 | Refills: 0 | Status: DISCONTINUED | COMMUNITY
Start: 2022-09-09 | End: 2024-05-09

## 2024-05-09 RX ORDER — ARIPIPRAZOLE 5 MG/1
5 TABLET ORAL
Qty: 30 | Refills: 0 | Status: DISCONTINUED | COMMUNITY
Start: 2022-09-09 | End: 2024-05-09

## 2024-05-09 RX ORDER — ARIPIPRAZOLE 2 MG/1
2 TABLET ORAL
Qty: 30 | Refills: 0 | Status: DISCONTINUED | COMMUNITY
Start: 2023-02-10 | End: 2024-05-09

## 2024-05-09 NOTE — HISTORY OF PRESENT ILLNESS
[FreeTextEntry1] : 22y/o lady with a history of Hashimoto's thyroiditis on synthroid, sleep d/o is accompanied by father here for follow up. Last visit on 12/6/2023 was our initial encounter with her. She was evaluated for slightly elevated liver enzymes and steatosis noted on imaging while being worked up for GI symptoms of bloating and early satiety. Patient had gained about 40lbs between 9004-7504, then lost some due to persistent nausea and vomiting.   Today, patient reports improvement in nausea and vomiting, and states she finds these symptoms are closely associated with stress during times when she had exams. Gastric emptying study was unremarkable. Now weight has been stable. For stress/anxiety, patient has been on lamotrigine (weaned off Abilify on her own because she did not feel like she needs it) and has weekly sessions with her therapist. Per father, patient made small lifestyle changes and is now walking more, but she is eating less and no longer like eating meat. Patient endorses poor appetite -- she saw a nutritionist in the past has not visited in a while.   Elevated liver enzymes was presumed due to MASLD/MASH given pattern of enzymes, now normalized on recent labs.   Liver serologies unrevealing. Very Rarely drinks alcohol. Fibroscan (12/2023): F0/S3  Meds: Lamotrigine 200mg, Birth control for PCOS, Synthroid 25, Modafinil

## 2024-05-09 NOTE — REVIEW OF SYSTEMS
[Negative] : Heme/Lymph [FreeTextEntry7] : Intermittent nausea but improving overall; decreased appetite

## 2024-05-09 NOTE — ASSESSMENT
[FreeTextEntry1] : 22y/o lady with a history of Bipolar d/o, Hashimoto's thyroiditis on synthroid, sleep d/o (on modafinil) who is here for follow up. Initially seen for evaluation for fatty liver and slightly elevated liver enzymes.  Liver synthetic function appears preserved. AST & ALT now normalized.  Suspect MASLD - given weight gain in the last 3-4 years. Fibroscan with S3 steatosis   CLD largely unrevealing Fibroscan with no fibrosis but withS3 steatosis. Suspect lean NAFLD. Re-emphasized on lifestyle changes - eating healthy, working on strengthening her core - which will help her liver and help her bloating symptoms and her mental health. Goal weight loss is about 10lbs in the next 6 months.  Advised to re-visit nutritionist for advice on healthy diet.  Will repeat fibroscan in one year but use her weight and enzymes as a surrogate for her liver disease for now. Per patient's request, refer to endocrinologist for further management of hashimoto's thyroiditis RTO in 6mo  Pt seen, examined and discussed with Ronald ORTIZ

## 2024-05-09 NOTE — PHYSICAL EXAM
[General Appearance - Alert] : alert [General Appearance - In No Acute Distress] : in no acute distress [Sclera] : the sclera and conjunctiva were normal [Auscultation Breath Sounds / Voice Sounds] : lungs were clear to auscultation bilaterally [Heart Rate And Rhythm] : heart rate was normal and rhythm regular [Heart Sounds] : normal S1 and S2 [Bowel Sounds] : normal bowel sounds [Abdomen Soft] : soft [Abdomen Tenderness] : non-tender [Abnormal Walk] : normal gait [Musculoskeletal - Swelling] : no joint swelling seen [Skin Color & Pigmentation] : normal skin color and pigmentation [] : no rash [Oriented To Time, Place, And Person] : oriented to person, place, and time [Impaired Insight] : insight and judgment were intact [Affect] : the affect was normal [Mood] : the mood was normal [General Appearance - Well-Appearing] : healthy appearing

## 2024-06-06 ENCOUNTER — NON-APPOINTMENT (OUTPATIENT)
Age: 24
End: 2024-06-06

## 2024-06-17 ENCOUNTER — TRANSCRIPTION ENCOUNTER (OUTPATIENT)
Age: 24
End: 2024-06-17

## 2024-10-09 ENCOUNTER — TRANSCRIPTION ENCOUNTER (OUTPATIENT)
Age: 24
End: 2024-10-09

## 2024-10-29 ENCOUNTER — TRANSCRIPTION ENCOUNTER (OUTPATIENT)
Age: 24
End: 2024-10-29

## 2024-11-06 ENCOUNTER — TRANSCRIPTION ENCOUNTER (OUTPATIENT)
Age: 24
End: 2024-11-06

## 2024-11-13 ENCOUNTER — TRANSCRIPTION ENCOUNTER (OUTPATIENT)
Age: 24
End: 2024-11-13

## 2024-11-14 ENCOUNTER — APPOINTMENT (OUTPATIENT)
Dept: HEPATOLOGY | Facility: CLINIC | Age: 24
End: 2024-11-14
Payer: MEDICAID

## 2024-11-14 VITALS
SYSTOLIC BLOOD PRESSURE: 125 MMHG | TEMPERATURE: 97.9 F | WEIGHT: 138 LBS | HEIGHT: 62.5 IN | HEART RATE: 93 BPM | BODY MASS INDEX: 24.76 KG/M2 | DIASTOLIC BLOOD PRESSURE: 90 MMHG | OXYGEN SATURATION: 97 %

## 2024-11-14 DIAGNOSIS — R16.0 HEPATOMEGALY, NOT ELSEWHERE CLASSIFIED: ICD-10-CM

## 2024-11-14 DIAGNOSIS — R79.89 OTHER SPECIFIED ABNORMAL FINDINGS OF BLOOD CHEMISTRY: ICD-10-CM

## 2024-11-14 PROCEDURE — 99214 OFFICE O/P EST MOD 30 MIN: CPT

## 2024-11-22 ENCOUNTER — APPOINTMENT (OUTPATIENT)
Dept: ENDOCRINOLOGY | Facility: CLINIC | Age: 24
End: 2024-11-22
Payer: MEDICAID

## 2024-11-22 VITALS
OXYGEN SATURATION: 95 % | WEIGHT: 139 LBS | HEIGHT: 62.5 IN | SYSTOLIC BLOOD PRESSURE: 134 MMHG | HEART RATE: 114 BPM | DIASTOLIC BLOOD PRESSURE: 82 MMHG | BODY MASS INDEX: 24.94 KG/M2

## 2024-11-22 DIAGNOSIS — E28.2 POLYCYSTIC OVARIAN SYNDROME: ICD-10-CM

## 2024-11-22 DIAGNOSIS — E03.9 HYPOTHYROIDISM, UNSPECIFIED: ICD-10-CM

## 2024-11-22 DIAGNOSIS — K76.0 FATTY (CHANGE OF) LIVER, NOT ELSEWHERE CLASSIFIED: ICD-10-CM

## 2024-11-22 PROCEDURE — 99215 OFFICE O/P EST HI 40 MIN: CPT

## 2024-11-23 PROBLEM — E03.9 HYPOTHYROIDISM: Status: ACTIVE | Noted: 2024-11-23

## 2024-11-23 PROBLEM — E28.2 PCOS (POLYCYSTIC OVARIAN SYNDROME): Status: ACTIVE | Noted: 2024-11-23

## 2025-04-23 ENCOUNTER — APPOINTMENT (OUTPATIENT)
Dept: ULTRASOUND IMAGING | Facility: CLINIC | Age: 25
End: 2025-04-23

## 2025-04-29 DIAGNOSIS — K76.0 FATTY (CHANGE OF) LIVER, NOT ELSEWHERE CLASSIFIED: ICD-10-CM

## 2025-05-30 ENCOUNTER — APPOINTMENT (OUTPATIENT)
Dept: HEPATOLOGY | Facility: CLINIC | Age: 25
End: 2025-05-30
Payer: MEDICAID

## 2025-05-30 DIAGNOSIS — K76.0 FATTY (CHANGE OF) LIVER, NOT ELSEWHERE CLASSIFIED: ICD-10-CM

## 2025-05-30 PROCEDURE — 76981 USE PARENCHYMA: CPT

## 2025-06-24 ENCOUNTER — NON-APPOINTMENT (OUTPATIENT)
Age: 25
End: 2025-06-24

## 2025-06-25 ENCOUNTER — APPOINTMENT (OUTPATIENT)
Dept: ENDOCRINOLOGY | Facility: CLINIC | Age: 25
End: 2025-06-25
Payer: COMMERCIAL

## 2025-06-25 VITALS
SYSTOLIC BLOOD PRESSURE: 132 MMHG | BODY MASS INDEX: 24.22 KG/M2 | TEMPERATURE: 98.4 F | OXYGEN SATURATION: 97 % | WEIGHT: 135 LBS | HEIGHT: 62.5 IN | DIASTOLIC BLOOD PRESSURE: 84 MMHG | HEART RATE: 98 BPM

## 2025-06-25 PROBLEM — E04.1 THYROID NODULE: Status: ACTIVE | Noted: 2025-06-25

## 2025-06-25 PROCEDURE — 99214 OFFICE O/P EST MOD 30 MIN: CPT

## 2025-06-26 ENCOUNTER — TRANSCRIPTION ENCOUNTER (OUTPATIENT)
Age: 25
End: 2025-06-26

## 2025-06-26 LAB
T3 SERPL-MCNC: 153 NG/DL
T4 FREE SERPL-MCNC: 1.3 NG/DL
TSH SERPL-ACNC: 1.68 UIU/ML

## 2025-07-21 ENCOUNTER — TRANSCRIPTION ENCOUNTER (OUTPATIENT)
Age: 25
End: 2025-07-21